# Patient Record
Sex: MALE | Race: BLACK OR AFRICAN AMERICAN | NOT HISPANIC OR LATINO | Employment: FULL TIME | RURAL
[De-identification: names, ages, dates, MRNs, and addresses within clinical notes are randomized per-mention and may not be internally consistent; named-entity substitution may affect disease eponyms.]

---

## 2021-06-08 ENCOUNTER — OFFICE VISIT (OUTPATIENT)
Dept: FAMILY MEDICINE | Facility: CLINIC | Age: 46
End: 2021-06-08
Payer: COMMERCIAL

## 2021-06-08 VITALS
RESPIRATION RATE: 18 BRPM | HEIGHT: 68 IN | SYSTOLIC BLOOD PRESSURE: 150 MMHG | DIASTOLIC BLOOD PRESSURE: 94 MMHG | HEART RATE: 83 BPM | TEMPERATURE: 98 F | OXYGEN SATURATION: 98 % | WEIGHT: 206.63 LBS | BODY MASS INDEX: 31.32 KG/M2

## 2021-06-08 DIAGNOSIS — J01.00 ACUTE NON-RECURRENT MAXILLARY SINUSITIS: Primary | ICD-10-CM

## 2021-06-08 DIAGNOSIS — I10 ESSENTIAL HYPERTENSION, BENIGN: ICD-10-CM

## 2021-06-08 PROCEDURE — 3008F PR BODY MASS INDEX (BMI) DOCUMENTED: ICD-10-PCS | Mod: ,,, | Performed by: NURSE PRACTITIONER

## 2021-06-08 PROCEDURE — 96372 PR INJECTION,THERAP/PROPH/DIAG2ST, IM OR SUBCUT: ICD-10-PCS | Mod: ,,, | Performed by: NURSE PRACTITIONER

## 2021-06-08 PROCEDURE — 3008F BODY MASS INDEX DOCD: CPT | Mod: ,,, | Performed by: NURSE PRACTITIONER

## 2021-06-08 PROCEDURE — 99213 PR OFFICE/OUTPT VISIT, EST, LEVL III, 20-29 MIN: ICD-10-PCS | Mod: 25,,, | Performed by: NURSE PRACTITIONER

## 2021-06-08 PROCEDURE — 99213 OFFICE O/P EST LOW 20 MIN: CPT | Mod: 25,,, | Performed by: NURSE PRACTITIONER

## 2021-06-08 PROCEDURE — 96372 THER/PROPH/DIAG INJ SC/IM: CPT | Mod: ,,, | Performed by: NURSE PRACTITIONER

## 2021-06-08 RX ORDER — AMLODIPINE BESYLATE 5 MG/1
5 TABLET ORAL DAILY
Qty: 90 TABLET | Refills: 0 | Status: SHIPPED | OUTPATIENT
Start: 2021-06-08 | End: 2021-08-30

## 2021-06-08 RX ORDER — DEXAMETHASONE SODIUM PHOSPHATE 4 MG/ML
6 INJECTION, SOLUTION INTRA-ARTICULAR; INTRALESIONAL; INTRAMUSCULAR; INTRAVENOUS; SOFT TISSUE ONCE
Status: COMPLETED | OUTPATIENT
Start: 2021-06-08 | End: 2021-06-08

## 2021-06-08 RX ORDER — CEFDINIR 300 MG/1
300 CAPSULE ORAL EVERY 12 HOURS
Qty: 20 CAPSULE | Refills: 0 | Status: SHIPPED | OUTPATIENT
Start: 2021-06-08 | End: 2021-06-18

## 2021-06-08 RX ORDER — DOXYLAMINE SUCCINATE AND PHENYLEPHRINE HYDROCHLORIDE 10.5; 1 MG/1; MG/1
1 TABLET ORAL 4 TIMES DAILY PRN
Qty: 20 TABLET | Refills: 0 | Status: SHIPPED | OUTPATIENT
Start: 2021-06-08

## 2021-06-08 RX ADMIN — DEXAMETHASONE SODIUM PHOSPHATE 6 MG: 4 INJECTION, SOLUTION INTRA-ARTICULAR; INTRALESIONAL; INTRAMUSCULAR; INTRAVENOUS; SOFT TISSUE at 09:06

## 2021-09-13 PROBLEM — J01.00 ACUTE NON-RECURRENT MAXILLARY SINUSITIS: Status: RESOLVED | Noted: 2021-06-08 | Resolved: 2021-09-13

## 2021-12-29 ENCOUNTER — OFFICE VISIT (OUTPATIENT)
Dept: FAMILY MEDICINE | Facility: CLINIC | Age: 46
End: 2021-12-29
Payer: COMMERCIAL

## 2021-12-29 VITALS — TEMPERATURE: 98 F

## 2021-12-29 DIAGNOSIS — Z20.822 COVID-19 RULED OUT BY LABORATORY TESTING: Primary | ICD-10-CM

## 2021-12-29 DIAGNOSIS — Z20.822 CONTACT WITH AND (SUSPECTED) EXPOSURE TO COVID-19: ICD-10-CM

## 2021-12-29 LAB
CTP QC/QA: YES
FLUAV AG NPH QL: NEGATIVE
FLUBV AG NPH QL: NEGATIVE
SARS-COV-2 AG RESP QL IA.RAPID: NEGATIVE

## 2021-12-29 PROCEDURE — 1160F PR REVIEW ALL MEDS BY PRESCRIBER/CLIN PHARMACIST DOCUMENTED: ICD-10-PCS | Mod: ,,, | Performed by: NURSE PRACTITIONER

## 2021-12-29 PROCEDURE — 87428 POCT SARS-COV2 (COVID) WITH FLU ANTIGEN: ICD-10-PCS | Mod: QW,,, | Performed by: NURSE PRACTITIONER

## 2021-12-29 PROCEDURE — 99051 PR MEDICAL SERVICES, EVE/WKEND/HOLIDAY: ICD-10-PCS | Mod: ,,, | Performed by: NURSE PRACTITIONER

## 2021-12-29 PROCEDURE — 99213 PR OFFICE/OUTPT VISIT, EST, LEVL III, 20-29 MIN: ICD-10-PCS | Mod: ,,, | Performed by: NURSE PRACTITIONER

## 2021-12-29 PROCEDURE — 99213 OFFICE O/P EST LOW 20 MIN: CPT | Mod: ,,, | Performed by: NURSE PRACTITIONER

## 2021-12-29 PROCEDURE — 87428 SARSCOV & INF VIR A&B AG IA: CPT | Mod: QW,,, | Performed by: NURSE PRACTITIONER

## 2021-12-29 PROCEDURE — 1159F MED LIST DOCD IN RCRD: CPT | Mod: ,,, | Performed by: NURSE PRACTITIONER

## 2021-12-29 PROCEDURE — 1160F RVW MEDS BY RX/DR IN RCRD: CPT | Mod: ,,, | Performed by: NURSE PRACTITIONER

## 2021-12-29 PROCEDURE — 99051 MED SERV EVE/WKEND/HOLIDAY: CPT | Mod: ,,, | Performed by: NURSE PRACTITIONER

## 2021-12-29 PROCEDURE — 1159F PR MEDICATION LIST DOCUMENTED IN MEDICAL RECORD: ICD-10-PCS | Mod: ,,, | Performed by: NURSE PRACTITIONER

## 2021-12-29 NOTE — PROGRESS NOTES
Subjective:       Patient ID: Hank Kenny is a 46 y.o. male.    Chief Complaint: URI (Coughing, congestion, drainage; started yesterday; vaccinated)    45yo AAM presents to clinic for Covid testing.  He reports not really having any symptoms. Son and wife tested positive with at home test so he took one and was positive so he came today to verify.     Review of Systems   Constitutional: Negative for chills, fatigue and fever.   HENT: Positive for congestion and postnasal drip. Negative for sneezing and sore throat.    Respiratory: Positive for cough. Negative for shortness of breath.    Cardiovascular: Negative for chest pain.   Gastrointestinal: Negative for diarrhea, nausea and vomiting.   Musculoskeletal: Negative for myalgias.   Skin: Negative for rash.   Neurological: Negative for headaches.         Objective:      Physical Exam  Vitals and nursing note reviewed.   Constitutional:       General: He is not in acute distress.     Appearance: Normal appearance. He is not ill-appearing.   HENT:      Head: Normocephalic.      Right Ear: Hearing normal.      Left Ear: Hearing normal.      Nose: Congestion present.   Eyes:      General: Lids are normal.      Extraocular Movements: Extraocular movements intact.      Conjunctiva/sclera: Conjunctivae normal.      Pupils: Pupils are equal, round, and reactive to light.   Neck:      Thyroid: No thyromegaly.   Cardiovascular:      Rate and Rhythm: Normal rate.   Pulmonary:      Effort: Pulmonary effort is normal. No respiratory distress.      Breath sounds: Normal breath sounds.   Musculoskeletal:         General: Normal range of motion.      Cervical back: Normal range of motion and neck supple.   Skin:     General: Skin is warm and dry.   Neurological:      General: No focal deficit present.      Mental Status: He is alert and oriented to person, place, and time.      Gait: Gait is intact.           Assessment:       COVID-19 ruled out by laboratory  testing    Contact with and (suspected) exposure to covid-19  -     POCT SARS-COV2 (COVID) with Flu Antigen             Plan:     Follow up if symptoms worsen or fail to improve.

## 2022-03-19 ENCOUNTER — OFFICE VISIT (OUTPATIENT)
Dept: FAMILY MEDICINE | Facility: CLINIC | Age: 47
End: 2022-03-19
Payer: COMMERCIAL

## 2022-03-19 VITALS
WEIGHT: 205 LBS | BODY MASS INDEX: 31.07 KG/M2 | TEMPERATURE: 98 F | DIASTOLIC BLOOD PRESSURE: 113 MMHG | OXYGEN SATURATION: 100 % | SYSTOLIC BLOOD PRESSURE: 118 MMHG | HEIGHT: 68 IN | HEART RATE: 53 BPM

## 2022-03-19 DIAGNOSIS — I10 ESSENTIAL HYPERTENSION, BENIGN: Primary | ICD-10-CM

## 2022-03-19 PROCEDURE — 1160F PR REVIEW ALL MEDS BY PRESCRIBER/CLIN PHARMACIST DOCUMENTED: ICD-10-PCS | Mod: ,,, | Performed by: FAMILY MEDICINE

## 2022-03-19 PROCEDURE — 1159F PR MEDICATION LIST DOCUMENTED IN MEDICAL RECORD: ICD-10-PCS | Mod: ,,, | Performed by: FAMILY MEDICINE

## 2022-03-19 PROCEDURE — 3008F PR BODY MASS INDEX (BMI) DOCUMENTED: ICD-10-PCS | Mod: ,,, | Performed by: FAMILY MEDICINE

## 2022-03-19 PROCEDURE — 99214 OFFICE O/P EST MOD 30 MIN: CPT | Mod: ,,, | Performed by: FAMILY MEDICINE

## 2022-03-19 PROCEDURE — 99051 MED SERV EVE/WKEND/HOLIDAY: CPT | Mod: ,,, | Performed by: FAMILY MEDICINE

## 2022-03-19 PROCEDURE — 3008F BODY MASS INDEX DOCD: CPT | Mod: ,,, | Performed by: FAMILY MEDICINE

## 2022-03-19 PROCEDURE — 1160F RVW MEDS BY RX/DR IN RCRD: CPT | Mod: ,,, | Performed by: FAMILY MEDICINE

## 2022-03-19 PROCEDURE — 3074F PR MOST RECENT SYSTOLIC BLOOD PRESSURE < 130 MM HG: ICD-10-PCS | Mod: ,,, | Performed by: FAMILY MEDICINE

## 2022-03-19 PROCEDURE — 99214 PR OFFICE/OUTPT VISIT, EST, LEVL IV, 30-39 MIN: ICD-10-PCS | Mod: ,,, | Performed by: FAMILY MEDICINE

## 2022-03-19 PROCEDURE — 99051 PR MEDICAL SERVICES, EVE/WKEND/HOLIDAY: ICD-10-PCS | Mod: ,,, | Performed by: FAMILY MEDICINE

## 2022-03-19 PROCEDURE — 3074F SYST BP LT 130 MM HG: CPT | Mod: ,,, | Performed by: FAMILY MEDICINE

## 2022-03-19 PROCEDURE — 1159F MED LIST DOCD IN RCRD: CPT | Mod: ,,, | Performed by: FAMILY MEDICINE

## 2022-03-19 PROCEDURE — 3080F DIAST BP >= 90 MM HG: CPT | Mod: ,,, | Performed by: FAMILY MEDICINE

## 2022-03-19 PROCEDURE — 3080F PR MOST RECENT DIASTOLIC BLOOD PRESSURE >= 90 MM HG: ICD-10-PCS | Mod: ,,, | Performed by: FAMILY MEDICINE

## 2022-03-19 RX ORDER — AMLODIPINE BESYLATE 5 MG/1
5 TABLET ORAL DAILY
Qty: 90 TABLET | Refills: 1 | Status: SHIPPED | OUTPATIENT
Start: 2022-03-19 | End: 2022-12-05 | Stop reason: SDUPTHER

## 2022-03-19 NOTE — PROGRESS NOTES
Subjective:       Patient ID: Hank Kenny is a 47 y.o. male.    Chief Complaint: Medication Refill (BP meds refill )    Pt needing refill of amlodipine 5mg, ran out 1 week ago, had been well controlled.     Review of Systems   Constitutional: Negative for activity change, appetite change, chills, diaphoresis, fatigue, fever and unexpected weight change.   HENT: Negative for nasal congestion, dental problem, drooling, ear discharge, ear pain, facial swelling, hearing loss, mouth sores, nosebleeds, postnasal drip, rhinorrhea, sinus pressure/congestion, sneezing, sore throat, tinnitus, trouble swallowing, voice change and goiter.    Eyes: Negative for photophobia, pain, discharge, redness, itching and visual disturbance.   Respiratory: Negative for apnea, cough, choking, chest tightness, shortness of breath, wheezing and stridor.    Cardiovascular: Negative for chest pain, palpitations, leg swelling and claudication.   Gastrointestinal: Negative for abdominal distention, abdominal pain, anal bleeding, blood in stool, change in bowel habit, constipation, diarrhea, nausea, vomiting, reflux, fecal incontinence and change in bowel habit.   Endocrine: Negative for cold intolerance, heat intolerance, polydipsia, polyphagia and polyuria.   Genitourinary: Negative for bladder incontinence, decreased urine volume, difficulty urinating, discharge, dysuria, enuresis, erectile dysfunction, flank pain, frequency, genital sores, hematuria, penile pain, testicular pain and urgency.   Musculoskeletal: Negative for arthralgias, back pain, gait problem, joint swelling, leg pain, myalgias, neck pain, neck stiffness and joint deformity.   Integumentary:  Negative for pallor, rash, wound and mole/lesion.   Allergic/Immunologic: Negative for environmental allergies, food allergies and frequent infections.   Neurological: Negative for dizziness, vertigo, tremors, seizures, syncope, facial asymmetry, speech difficulty, weakness,  light-headedness, numbness, headaches, disturbances in coordination, memory loss and coordination difficulties.   Hematological: Negative for adenopathy. Does not bruise/bleed easily.   Psychiatric/Behavioral: Negative for agitation, behavioral problems, confusion, decreased concentration, dysphoric mood, hallucinations, self-injury, sleep disturbance and suicidal ideas. The patient is not nervous/anxious and is not hyperactive.          Objective:      Physical Exam  Vitals reviewed.   Constitutional:       Appearance: Normal appearance. He is normal weight.   HENT:      Head: Normocephalic and atraumatic.      Right Ear: Tympanic membrane and ear canal normal.      Left Ear: Tympanic membrane, ear canal and external ear normal.      Nose: Nose normal.      Mouth/Throat:      Mouth: Mucous membranes are moist.      Pharynx: Oropharynx is clear.   Eyes:      Extraocular Movements: Extraocular movements intact.      Conjunctiva/sclera: Conjunctivae normal.      Pupils: Pupils are equal, round, and reactive to light.   Cardiovascular:      Rate and Rhythm: Normal rate and regular rhythm.      Pulses: Normal pulses.      Heart sounds: Normal heart sounds.   Pulmonary:      Effort: Pulmonary effort is normal.      Breath sounds: Normal breath sounds.   Abdominal:      General: Abdomen is flat. Bowel sounds are normal.      Palpations: Abdomen is soft.   Musculoskeletal:         General: Normal range of motion.      Cervical back: Normal range of motion and neck supple.   Skin:     General: Skin is warm and dry.   Neurological:      General: No focal deficit present.      Mental Status: He is alert and oriented to person, place, and time. Mental status is at baseline.   Psychiatric:         Mood and Affect: Mood normal.         Behavior: Behavior normal.         Thought Content: Thought content normal.         Judgment: Judgment normal.         Assessment:       1. Essential hypertension, benign        Plan:      Essential hypertension, benign    Other orders  -     amLODIPine (NORVASC) 5 MG tablet; Take 1 tablet (5 mg total) by mouth once daily.  Dispense: 90 tablet; Refill: 1         Will refill amlodipine, f/u as needed.

## 2022-10-12 ENCOUNTER — CLINICAL SUPPORT (OUTPATIENT)
Dept: PRIMARY CARE CLINIC | Facility: CLINIC | Age: 47
End: 2022-10-12

## 2022-10-12 DIAGNOSIS — Z02.4 ENCOUNTER FOR DEPARTMENT OF TRANSPORTATION (DOT) EXAMINATION FOR DRIVING LICENSE RENEWAL: Primary | ICD-10-CM

## 2022-10-12 PROCEDURE — 99499 UNLISTED E&M SERVICE: CPT | Mod: ,,, | Performed by: NURSE PRACTITIONER

## 2022-10-12 PROCEDURE — 99499 PR PHYSICAL - DOT/CDL: ICD-10-PCS | Mod: ,,, | Performed by: NURSE PRACTITIONER

## 2022-10-12 NOTE — PROGRESS NOTES
Subjective:       Patient ID: Hank Kenny is a 47 y.o. male.    Chief Complaint: No chief complaint on file.    HPI  Review of Systems      Objective:      Physical Exam    Assessment:       Problem List Items Addressed This Visit    None  Visit Diagnoses       Encounter for Department of Transportation (DOT) examination for driving license renewal    -  Primary            Plan:       See scanned documents in .

## 2022-12-05 RX ORDER — AMLODIPINE BESYLATE 5 MG/1
5 TABLET ORAL DAILY
Qty: 90 TABLET | Refills: 1 | Status: SHIPPED | OUTPATIENT
Start: 2022-12-05 | End: 2023-06-12 | Stop reason: SDUPTHER

## 2023-02-05 ENCOUNTER — OFFICE VISIT (OUTPATIENT)
Dept: FAMILY MEDICINE | Facility: CLINIC | Age: 48
End: 2023-02-05
Payer: COMMERCIAL

## 2023-02-05 VITALS
HEIGHT: 68 IN | OXYGEN SATURATION: 98 % | WEIGHT: 211.38 LBS | SYSTOLIC BLOOD PRESSURE: 139 MMHG | HEART RATE: 65 BPM | TEMPERATURE: 98 F | RESPIRATION RATE: 18 BRPM | BODY MASS INDEX: 32.04 KG/M2 | DIASTOLIC BLOOD PRESSURE: 87 MMHG

## 2023-02-05 DIAGNOSIS — S81.812A LACERATION OF LEFT LOWER EXTREMITY, INITIAL ENCOUNTER: Primary | ICD-10-CM

## 2023-02-05 DIAGNOSIS — Z23 IMMUNIZATION DUE: ICD-10-CM

## 2023-02-05 PROCEDURE — 90471 IMMUNIZATION ADMIN: CPT | Mod: ,,, | Performed by: FAMILY MEDICINE

## 2023-02-05 PROCEDURE — 1159F PR MEDICATION LIST DOCUMENTED IN MEDICAL RECORD: ICD-10-PCS | Mod: ,,, | Performed by: FAMILY MEDICINE

## 2023-02-05 PROCEDURE — 3075F PR MOST RECENT SYSTOLIC BLOOD PRESS GE 130-139MM HG: ICD-10-PCS | Mod: ,,, | Performed by: FAMILY MEDICINE

## 2023-02-05 PROCEDURE — 99051 MED SERV EVE/WKEND/HOLIDAY: CPT | Mod: ,,, | Performed by: FAMILY MEDICINE

## 2023-02-05 PROCEDURE — 3079F DIAST BP 80-89 MM HG: CPT | Mod: ,,, | Performed by: FAMILY MEDICINE

## 2023-02-05 PROCEDURE — 1160F RVW MEDS BY RX/DR IN RCRD: CPT | Mod: ,,, | Performed by: FAMILY MEDICINE

## 2023-02-05 PROCEDURE — 12002 PR RESUP NPTERF WND BODY 2.6-7.5 CM: ICD-10-PCS | Mod: ,,, | Performed by: FAMILY MEDICINE

## 2023-02-05 PROCEDURE — 99214 OFFICE O/P EST MOD 30 MIN: CPT | Mod: 25,,, | Performed by: FAMILY MEDICINE

## 2023-02-05 PROCEDURE — 99214 PR OFFICE/OUTPT VISIT, EST, LEVL IV, 30-39 MIN: ICD-10-PCS | Mod: 25,,, | Performed by: FAMILY MEDICINE

## 2023-02-05 PROCEDURE — 1160F PR REVIEW ALL MEDS BY PRESCRIBER/CLIN PHARMACIST DOCUMENTED: ICD-10-PCS | Mod: ,,, | Performed by: FAMILY MEDICINE

## 2023-02-05 PROCEDURE — 3008F BODY MASS INDEX DOCD: CPT | Mod: ,,, | Performed by: FAMILY MEDICINE

## 2023-02-05 PROCEDURE — 90471 TDAP VACCINE GREATER THAN OR EQUAL TO 7YO IM: ICD-10-PCS | Mod: ,,, | Performed by: FAMILY MEDICINE

## 2023-02-05 PROCEDURE — 3079F PR MOST RECENT DIASTOLIC BLOOD PRESSURE 80-89 MM HG: ICD-10-PCS | Mod: ,,, | Performed by: FAMILY MEDICINE

## 2023-02-05 PROCEDURE — 3008F PR BODY MASS INDEX (BMI) DOCUMENTED: ICD-10-PCS | Mod: ,,, | Performed by: FAMILY MEDICINE

## 2023-02-05 PROCEDURE — 90715 TDAP VACCINE GREATER THAN OR EQUAL TO 7YO IM: ICD-10-PCS | Mod: ,,, | Performed by: FAMILY MEDICINE

## 2023-02-05 PROCEDURE — 1159F MED LIST DOCD IN RCRD: CPT | Mod: ,,, | Performed by: FAMILY MEDICINE

## 2023-02-05 PROCEDURE — 3075F SYST BP GE 130 - 139MM HG: CPT | Mod: ,,, | Performed by: FAMILY MEDICINE

## 2023-02-05 PROCEDURE — 12002 RPR S/N/AX/GEN/TRNK2.6-7.5CM: CPT | Mod: ,,, | Performed by: FAMILY MEDICINE

## 2023-02-05 PROCEDURE — 90715 TDAP VACCINE 7 YRS/> IM: CPT | Mod: ,,, | Performed by: FAMILY MEDICINE

## 2023-02-05 PROCEDURE — 99051 PR MEDICAL SERVICES, EVE/WKEND/HOLIDAY: ICD-10-PCS | Mod: ,,, | Performed by: FAMILY MEDICINE

## 2023-02-05 RX ORDER — CLINDAMYCIN HYDROCHLORIDE 300 MG/1
300 CAPSULE ORAL 3 TIMES DAILY
Qty: 21 CAPSULE | Refills: 0 | Status: SHIPPED | OUTPATIENT
Start: 2023-02-05 | End: 2023-02-12

## 2023-02-05 NOTE — LETTER
February 5, 2023      [facility]  [address] [facility] [address]  Phone: [phone]  Fax: [phone]       Patient: Hank Kenny   YOB: 1975  Date of Visit: 02/05/2023    To Whom It May Concern:    CHARMAINE Kenny  was at [facility] on 02/05/2023. The patient may return to work/school on 02/07/2023 with no restrictions. If you have any questions or concerns, or if I can be of further assistance, please do not hesitate to contact me.    Sincerely,    Natalie Monaco RN

## 2023-02-05 NOTE — PROGRESS NOTES
Subjective:       Patient ID: Hank Kenny is a 47 y.o. male.    Chief Complaint: Puncture Wound (Left lower leg. Pt states fell through the wood floor and cut his leg. Patient states he is having no pain. )    Pt fell through floor and had laceration of left lower anterior leg, this happened 3 hours ago.     Review of Systems   Constitutional:  Negative for activity change, appetite change, chills, diaphoresis, fatigue, fever and unexpected weight change.   HENT:  Negative for nasal congestion, dental problem, drooling, ear discharge, ear pain, facial swelling, hearing loss, mouth sores, nosebleeds, postnasal drip, rhinorrhea, sinus pressure/congestion, sneezing, sore throat, tinnitus, trouble swallowing, voice change and goiter.    Eyes:  Negative for photophobia, pain, discharge, redness, itching and visual disturbance.   Respiratory:  Negative for apnea, cough, choking, chest tightness, shortness of breath, wheezing and stridor.    Cardiovascular:  Negative for chest pain, palpitations, leg swelling and claudication.   Gastrointestinal:  Negative for abdominal distention, abdominal pain, anal bleeding, blood in stool, change in bowel habit, constipation, diarrhea, nausea, vomiting, reflux, fecal incontinence and change in bowel habit.   Endocrine: Negative for cold intolerance, heat intolerance, polydipsia, polyphagia and polyuria.   Genitourinary:  Negative for bladder incontinence, decreased urine volume, difficulty urinating, discharge, dysuria, enuresis, erectile dysfunction, flank pain, frequency, genital sores, hematuria, penile pain, testicular pain and urgency.   Musculoskeletal:  Negative for arthralgias, back pain, gait problem, joint swelling, leg pain, myalgias, neck pain, neck stiffness and joint deformity.   Integumentary:  Positive for wound. Negative for pallor, rash and mole/lesion.   Allergic/Immunologic: Negative for environmental allergies, food allergies and frequent infections.    Neurological:  Negative for dizziness, vertigo, tremors, seizures, syncope, facial asymmetry, speech difficulty, weakness, light-headedness, numbness, headaches, coordination difficulties, memory loss and coordination difficulties.   Hematological:  Negative for adenopathy. Does not bruise/bleed easily.   Psychiatric/Behavioral:  Negative for agitation, behavioral problems, confusion, decreased concentration, dysphoric mood, hallucinations, self-injury, sleep disturbance and suicidal ideas. The patient is not nervous/anxious and is not hyperactive.        Objective:      Physical Exam  Vitals reviewed.   Constitutional:       Appearance: Normal appearance. He is normal weight.   HENT:      Head: Normocephalic and atraumatic.      Right Ear: Tympanic membrane and ear canal normal.      Left Ear: Tympanic membrane, ear canal and external ear normal.      Nose: Nose normal.      Mouth/Throat:      Mouth: Mucous membranes are moist.      Pharynx: Oropharynx is clear.   Eyes:      Extraocular Movements: Extraocular movements intact.      Conjunctiva/sclera: Conjunctivae normal.      Pupils: Pupils are equal, round, and reactive to light.   Cardiovascular:      Rate and Rhythm: Normal rate and regular rhythm.      Pulses: Normal pulses.      Heart sounds: Normal heart sounds.   Pulmonary:      Effort: Pulmonary effort is normal.      Breath sounds: Normal breath sounds.   Abdominal:      General: Abdomen is flat. Bowel sounds are normal.      Palpations: Abdomen is soft.   Musculoskeletal:         General: Normal range of motion.      Cervical back: Normal range of motion and neck supple.   Skin:     General: Skin is warm and dry.      Findings: Lesion present.      Comments: Left anterior lower leg laceration 5 cm in length.    Neurological:      General: No focal deficit present.      Mental Status: He is alert and oriented to person, place, and time. Mental status is at baseline.   Psychiatric:         Mood and  Affect: Mood normal.         Behavior: Behavior normal.         Thought Content: Thought content normal.         Judgment: Judgment normal.       Assessment:       1. Laceration of left lower extremity, initial encounter    2. Immunization due        Plan:     Laceration of left lower extremity, initial encounter  -     (In Office Administered) Tdap Vaccine    Immunization due  -     (In Office Administered) Tdap Vaccine    Other orders  -     clindamycin (CLEOCIN) 300 MG capsule; Take 1 capsule (300 mg total) by mouth 3 (three) times daily. for 7 days  Dispense: 21 capsule; Refill: 0         Laceration successfully sutured, will give tetanus shot and abx, f/u in 10 days for removal of sutures.

## 2023-06-12 DIAGNOSIS — I10 HYPERTENSION, UNSPECIFIED TYPE: Primary | ICD-10-CM

## 2023-06-12 RX ORDER — AMLODIPINE BESYLATE 5 MG/1
5 TABLET ORAL DAILY
Qty: 90 TABLET | Refills: 1 | Status: SHIPPED | OUTPATIENT
Start: 2023-06-12 | End: 2023-12-13

## 2023-10-10 ENCOUNTER — CLINICAL SUPPORT (OUTPATIENT)
Dept: PRIMARY CARE CLINIC | Facility: CLINIC | Age: 48
End: 2023-10-10
Payer: COMMERCIAL

## 2023-10-10 DIAGNOSIS — Z02.4 ENCOUNTER FOR DEPARTMENT OF TRANSPORTATION (DOT) EXAMINATION FOR DRIVING LICENSE RENEWAL: Primary | ICD-10-CM

## 2023-10-10 PROCEDURE — 99499 PR PHYSICAL - DOT/CDL: ICD-10-PCS | Mod: ,,, | Performed by: NURSE PRACTITIONER

## 2023-10-10 PROCEDURE — 99499 UNLISTED E&M SERVICE: CPT | Mod: ,,, | Performed by: NURSE PRACTITIONER

## 2023-10-10 NOTE — PROGRESS NOTES
Subjective     Patient ID: Hank Kenny is a 48 y.o. male.    Chief Complaint: No chief complaint on file.    HPI  Review of Systems       Objective     Physical Exam       Assessment and Plan     1. Encounter for Department of Transportation (DOT) examination for driving license renewal        See scanned documents in .            No follow-ups on file.

## 2023-12-09 DIAGNOSIS — I10 HYPERTENSION, UNSPECIFIED TYPE: ICD-10-CM

## 2023-12-13 RX ORDER — AMLODIPINE BESYLATE 5 MG/1
5 TABLET ORAL
Qty: 90 TABLET | Refills: 1 | Status: SHIPPED | OUTPATIENT
Start: 2023-12-13

## 2024-06-17 DIAGNOSIS — I10 HYPERTENSION, UNSPECIFIED TYPE: Primary | ICD-10-CM

## 2024-06-17 RX ORDER — AMLODIPINE BESYLATE 5 MG/1
5 TABLET ORAL DAILY
Qty: 90 TABLET | Refills: 3 | Status: SHIPPED | OUTPATIENT
Start: 2024-06-17 | End: 2025-06-17

## 2024-10-04 ENCOUNTER — CLINICAL SUPPORT (OUTPATIENT)
Dept: PRIMARY CARE CLINIC | Facility: CLINIC | Age: 49
End: 2024-10-04

## 2024-10-04 DIAGNOSIS — Z02.4 ENCOUNTER FOR DEPARTMENT OF TRANSPORTATION (DOT) EXAMINATION FOR DRIVING LICENSE RENEWAL: Primary | ICD-10-CM

## 2024-10-04 NOTE — PROGRESS NOTES
Subjective     Patient ID: Hank Kenny is a 49 y.o. male.    Chief Complaint: No chief complaint on file.    HPI  Review of Systems       Objective     Physical Exam       Assessment and Plan     1. Encounter for Department of Transportation (DOT) examination for driving license renewal        See scanned documents in .            No follow-ups on file.